# Patient Record
Sex: FEMALE | Race: WHITE | NOT HISPANIC OR LATINO | Employment: UNEMPLOYED | URBAN - METROPOLITAN AREA
[De-identification: names, ages, dates, MRNs, and addresses within clinical notes are randomized per-mention and may not be internally consistent; named-entity substitution may affect disease eponyms.]

---
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
## 2024-01-12 DIAGNOSIS — F43.10 PTSD (POST-TRAUMATIC STRESS DISORDER): ICD-10-CM

## 2024-01-12 DIAGNOSIS — F33.9 RECURRENT MAJOR DEPRESSIVE DISORDER, REMISSION STATUS UNSPECIFIED (CMS-HCC): ICD-10-CM

## 2024-02-14 ENCOUNTER — TELEPHONE (OUTPATIENT)
Dept: PEDIATRICS | Facility: CLINIC | Age: 19
End: 2024-02-14

## 2024-02-14 ENCOUNTER — OFFICE VISIT (OUTPATIENT)
Dept: PEDIATRICS | Facility: CLINIC | Age: 19
End: 2024-02-14
Payer: COMMERCIAL

## 2024-02-14 VITALS — TEMPERATURE: 98.5 F | BODY MASS INDEX: 23.24 KG/M2 | WEIGHT: 123 LBS

## 2024-02-14 DIAGNOSIS — L05.01 PILONIDAL ABSCESS: Primary | ICD-10-CM

## 2024-02-14 PROCEDURE — 87075 CULTR BACTERIA EXCEPT BLOOD: CPT

## 2024-02-14 PROCEDURE — 99214 OFFICE O/P EST MOD 30 MIN: CPT | Performed by: PEDIATRICS

## 2024-02-14 PROCEDURE — 87070 CULTURE OTHR SPECIMN AEROBIC: CPT

## 2024-02-14 PROCEDURE — 1036F TOBACCO NON-USER: CPT | Performed by: PEDIATRICS

## 2024-02-14 PROCEDURE — 87185 SC STD ENZYME DETCJ PER NZM: CPT

## 2024-02-14 PROCEDURE — 87205 SMEAR GRAM STAIN: CPT

## 2024-02-14 RX ORDER — CEFDINIR 300 MG/1
300 CAPSULE ORAL 2 TIMES DAILY
Qty: 20 CAPSULE | Refills: 0 | Status: SHIPPED | OUTPATIENT
Start: 2024-02-14 | End: 2024-02-24

## 2024-02-14 RX ORDER — AMOXICILLIN AND CLAVULANATE POTASSIUM 875; 125 MG/1; MG/1
1 TABLET, FILM COATED ORAL
Qty: 20 TABLET | Refills: 0 | Status: SHIPPED | OUTPATIENT
Start: 2024-02-14 | End: 2024-02-24

## 2024-02-17 LAB
B-LACTAMASE ORGANISM ISLT: NEGATIVE
BACTERIA SPEC CULT: NORMAL
BACTERIA SPEC CULT: NORMAL
GRAM STN SPEC: NORMAL
GRAM STN SPEC: NORMAL

## 2024-02-22 DIAGNOSIS — B37.31 VAGINAL YEAST INFECTION: Primary | ICD-10-CM

## 2024-02-22 RX ORDER — FLUCONAZOLE 150 MG/1
TABLET ORAL
Qty: 2 TABLET | Refills: 0 | Status: SHIPPED | OUTPATIENT
Start: 2024-02-22 | End: 2024-03-21 | Stop reason: WASHOUT

## 2024-02-28 NOTE — PROGRESS NOTES
"  Patient ID: Desiree Hood is a 18 y.o. female who presents with Mom for Tailbone Pain.        HPI    Comes in today with mom.  She had some pain over her tailbone for \"a while\".  Past few days has gotten worse.  They have noticed more redness and swelling.  Does not recall any trauma.    Review of Systems    EYES: No injection no drainage  ENT: Normal  GI: No N/V/D  RESP: No cough, congestion, no SOB  CV: No chest pain, palpitations  Neuro: Normal  SKIN:As noted in HPI    Objective   Temp 36.9 °C (98.5 °F)   Wt 55.8 kg (123 lb)   BMI 23.24 kg/m²   BSA: 1.55 meters squared  Growth percentiles: No height on file for this encounter. 42 %ile (Z= -0.20) based on CDC (Girls, 2-20 Years) weight-for-age data using vitals from 2/14/2024.       Physical Exam    Large abscess at the midline over her tailbone.  Fluctuant fluid appreciated.    Area cleaned with Betadine.  Numbed with 1% lidocaine.  Approximately 3 mm incision made with lancet.  Large amount of purulent material expressed.  Culture sent    ASSESSMENT and PLAN:    Diagnoses and all orders for this visit:  Pilonidal abscess  -     Tissue/Wound Culture/Smear  -     amoxicillin-pot clavulanate (Augmentin) 875-125 mg tablet; Take 1 tablet by mouth 2 times a day after meals for 10 days.  -     cefdinir (Omnicef) 300 mg capsule; Take 1 capsule (300 mg) by mouth 2 times a day for 10 days.    Normal progression and time course of diagnosis were discussed.         All questions were answered. I have asked them to call me as needed with an update. They of course can call me sooner if they have any questions or further concerns.              "

## 2024-03-20 ENCOUNTER — TELEMEDICINE (OUTPATIENT)
Dept: BEHAVIORAL HEALTH | Facility: CLINIC | Age: 19
End: 2024-03-20
Payer: COMMERCIAL

## 2024-03-20 DIAGNOSIS — F33.9 RECURRENT MAJOR DEPRESSIVE DISORDER, REMISSION STATUS UNSPECIFIED (CMS-HCC): ICD-10-CM

## 2024-03-20 PROCEDURE — 1036F TOBACCO NON-USER: CPT | Performed by: STUDENT IN AN ORGANIZED HEALTH CARE EDUCATION/TRAINING PROGRAM

## 2024-03-20 PROCEDURE — NCVST PR NC VISIT: Performed by: STUDENT IN AN ORGANIZED HEALTH CARE EDUCATION/TRAINING PROGRAM

## 2024-03-20 NOTE — PROGRESS NOTES
"Outpatient Child and Adolescent Psychiatry  Follow up Visit    All individuals present at appointment: Patient and Fellow  Virtual evaluation    SUBJECTIVE:    Per pt:  -doing well, motivated, not taking naps, \"pretty good outlook on life\"  -more happy days than not  -at [employer], studying psychology, living in dorms, friends are great, going home once a month  -still taking Zoloft 100mg daily  -last few weeks, sleeping 7-8 hrs a night, but tons of energy when waking up, woke up at 5:30 and high energy all day one day recently, hard to regulate emotions, really giddy all the time, maybe due to weather warming up; started skipping some doses of zoloft, would feel slow 1-2 days later, negative thoughts started 2 days later; did have to take half doses for the last week 2/2 waiting for refill (mail pharmacy), edgy and lethargic towards the end of the week  -feels like mood has consistently improved over the last year/months, circumstances are great currently  -therapist - sporadically, at least once a month    [facility]       REVIEW OF SYSTEMS  As in HPI    Objective:  There were no vitals taken for this visit.  There is no height or weight on file to calculate BMI.  No height and weight on file for this encounter.  Wt Readings from Last 4 Encounters:   02/14/24 55.8 kg (123 lb) (42 %, Z= -0.20)*   11/16/23 50.6 kg (111 lb 9.6 oz) (20 %, Z= -0.85)*   07/18/23 49.7 kg (109 lb 9.6 oz) (17 %, Z= -0.95)*   03/06/23 49 kg (108 lb) (15 %, Z= -1.02)*     * Growth percentiles are based on CDC (Girls, 2-20 Years) data.       Mental Status Exam  - General: sitting in dorm room  - Appearance: appropriate grooming and hygiene.  - Behavior: Maintained fair eye contact throughout assessment.   - Attitude: Pleasant and cooperative to interview.   - Motor: No abnormal movements appreciated.  - Speech: Spoke with a normal rate, volume, tone, and rhythm. Clear, fluent.  - Mood: \"doing well, motivated\"  - Affect: " congruent, euthymic, fair range, stable  - Thought Process: organized, logical  - Thought Content: No SI. No HI. No evidence of delusions.  - Perceptions: No AVH. Did not appear to be responding to hallucinatory stimuli.  - Cognition: grossly oriented. No significant deficits, although not formally tested.  - BERTA: Average.  - Insight: good  - Judgment: good    Current Medications:   Current Outpatient Medications on File Prior to Visit   Medication Sig Dispense Refill    fluconazole (Diflucan) 150 mg tablet Take 1 dose now and repeat in 10 days. 2 tablet 0    melatonin 3 mg tablet TAKE 1 TABLET AT SUNDOWN (BY 9 P.M. AT THE LATEST) 30 tablet 2    sertraline (Zoloft) 100 mg tablet TAKE 1 TABLET DAILY 30 tablet 2    spironolactone (Aldactone) 50 mg tablet Take by mouth.      Xulane 150-35 mcg/24 hr apply 1 patch weekly for 3 weeks  then off for 1 week       No current facility-administered medications on file prior to visit.        Laboratory/Imaging/Diagnostic Tests  Recent Results (from the past 2016 hour(s))   Tissue/Wound Culture/Smear    Collection Time: 02/14/24 10:28 AM    Specimen: Skin/Superficial Abscess; Tissue/Biopsy   Result Value Ref Range    Tissue/Wound Culture/Smear (3+) Moderate Mixed Skin Microorganisms     Tissue/Wound Culture/Smear (4+) Abundant Mixed Anaerobic Bacteria     Beta Lactamase (Cefinase) Negative     Gram Stain No polymorphonuclear leukocytes seen     Gram Stain (3+) Moderate Mixed Gram positive bacteria         Assessment:     Desiree Hood is a 18 y.o. female with a psych hx of MDD and PTSD.     Update 03/20/2024: overall improvement in mood and affect, as well as other prior sx. Due to pt preference and c/f for activation at current dose of Zoloft, ok to trial lower dose. However, suspect reported activation may actually represent ongoing improvement in mood and sx.    Psychiatric Risk Assessment:  Violence Static Risk Factors: hx of trauma  Violence Dynamic Risk Factors:  none  Violence Protective Factors: stable home environment with supportive family, strong attachments, resilient personality, greater academic engagement, and engagement with mental health tx  Acute Risk of Harm to Others is Considered: low   Chronic Risk of Harm to Others is Considered: low   Mitigated by: increased frequency of psychiatric tx, close follow-up with providers, and relevant pharmacologic tx    Suicide Static Risk Factors: hx of psychiatric disorder and hx of trauma  Suicide Dynamic Risk Factors: none  Suicide Protective Factors: therapeutic relationship with providers, supportive family and friends, positive coping skills, motivated for treatment, future-oriented, and engaged in treatment  Acute Risk of Harm to Self is Considered: low  Chronic Risk of Harm to Others is Considered: low  Mitigated by: increased frequency of psychiatric tx, close follow-up with providers, and relevant pharmacologic tx    Impression:  MDD  PTSD    Plan:  -trial decrease of Zoloft to 75mg PO daily (rx for 50mg BID, planning to take 1.5 tablets daily, can increase back to 2 tablets daily if needed)  -increase therapy to biweekly    Follow up: 3-5 weeks, sooner if needed    A/P to be discussed with Dr. Ramos.    Dedra Perea MD

## 2024-03-21 RX ORDER — SERTRALINE HYDROCHLORIDE 50 MG/1
50 TABLET, FILM COATED ORAL 2 TIMES DAILY
Qty: 60 TABLET | Refills: 3 | Status: SHIPPED | OUTPATIENT
Start: 2024-03-21 | End: 2024-07-19

## 2024-04-24 ENCOUNTER — OFFICE VISIT (OUTPATIENT)
Dept: PEDIATRICS | Facility: CLINIC | Age: 19
End: 2024-04-24
Payer: COMMERCIAL

## 2024-04-24 DIAGNOSIS — L05.91 PILONIDAL CYST: Primary | ICD-10-CM

## 2024-04-24 PROCEDURE — 87205 SMEAR GRAM STAIN: CPT

## 2024-04-24 PROCEDURE — 99213 OFFICE O/P EST LOW 20 MIN: CPT | Performed by: PEDIATRICS

## 2024-04-24 PROCEDURE — 87075 CULTR BACTERIA EXCEPT BLOOD: CPT

## 2024-04-24 PROCEDURE — 87070 CULTURE OTHR SPECIMN AEROBIC: CPT

## 2024-04-24 PROCEDURE — 1036F TOBACCO NON-USER: CPT | Performed by: PEDIATRICS

## 2024-04-24 PROCEDURE — 87185 SC STD ENZYME DETCJ PER NZM: CPT

## 2024-04-24 RX ORDER — SULFAMETHOXAZOLE AND TRIMETHOPRIM 800; 160 MG/1; MG/1
1 TABLET ORAL 2 TIMES DAILY
Qty: 14 TABLET | Refills: 0 | Status: SHIPPED | OUTPATIENT
Start: 2024-04-24 | End: 2024-05-01

## 2024-04-24 RX ORDER — METRONIDAZOLE 500 MG/1
500 TABLET ORAL 2 TIMES DAILY
Qty: 14 TABLET | Refills: 0 | Status: SHIPPED | OUTPATIENT
Start: 2024-04-24 | End: 2024-05-01

## 2024-04-24 NOTE — PROGRESS NOTES
Pediatric Sick Encounter Note    Subjective   Patient ID: Desiree Hood is a 18 y.o. female who presents for Abscess (Recurrent ? Abscess to Tail Bone per Mom, Drained previously).  Today she is accompanied by accompanied by mother.     HPI    February pilonidal abscess which was lanced and treated with Augmentin and Cefdinir.   Diarrhea - 5 days ago  Pain started shortly after the diarrhea  Diarrhea is now resolved  More irritated to sit on  No fever  No nausea or vomiting.       Review of Systems    Objective   There were no vitals taken for this visit.  BSA: There is no height or weight on file to calculate BSA.  Growth percentiles: No height on file for this encounter. No weight on file for this encounter.     Physical Exam  Vitals and nursing note reviewed.   Constitutional:       General: She is not in acute distress.     Appearance: Normal appearance.   HENT:      Head: Normocephalic and atraumatic.      Nose: Nose normal.      Mouth/Throat:      Mouth: Mucous membranes are moist.      Pharynx: Oropharynx is clear.   Eyes:      Conjunctiva/sclera: Conjunctivae normal.      Pupils: Pupils are equal, round, and reactive to light.   Cardiovascular:      Rate and Rhythm: Normal rate and regular rhythm.      Heart sounds: Normal heart sounds. No murmur heard.  Pulmonary:      Effort: Pulmonary effort is normal. No respiratory distress.      Breath sounds: Normal breath sounds. No wheezing.   Abdominal:      General: Abdomen is flat.      Palpations: Abdomen is soft.   Musculoskeletal:      Cervical back: Normal range of motion and neck supple.      Comments: Gluteal cleft with 2cm vertical healing scar, at the inferior aspect of this scar is a pinpoint opening, able to express a small amount of milky discharge   Skin:     General: Skin is warm.      Capillary Refill: Capillary refill takes less than 2 seconds.   Neurological:      Mental Status: She is alert.         Assessment/Plan   Diagnoses and all orders for  this visit:  Pilonidal cyst  -     metroNIDAZOLE (Flagyl) 500 mg tablet; Take 1 tablet (500 mg) by mouth 2 times a day for 7 days.  -     sulfamethoxazole-trimethoprim (Bactrim DS) 800-160 mg tablet; Take 1 tablet by mouth 2 times a day for 7 days.  -     Tissue/Wound Culture/Smear  Desiree is a 18 year old female with a history of pilonidal cyst who presents due to to pain of her coccyx. Able to express a small amount. No abscess. Will treat with Bactrim and Metronidazole. Sent culture and will call if changes should be made.

## 2024-04-24 NOTE — PATIENT INSTRUCTIONS
Start Bactrim and Metronidazole twice daily x 7 days.   Will call with culture results.   Keep area clean and dry.

## 2024-04-27 LAB
B-LACTAMASE ORGANISM ISLT: NEGATIVE
BACTERIA SPEC CULT: NORMAL
GRAM STN SPEC: NORMAL
GRAM STN SPEC: NORMAL